# Patient Record
Sex: FEMALE | URBAN - METROPOLITAN AREA
[De-identification: names, ages, dates, MRNs, and addresses within clinical notes are randomized per-mention and may not be internally consistent; named-entity substitution may affect disease eponyms.]

---

## 2024-01-30 ENCOUNTER — APPOINTMENT (RX ONLY)
Dept: URBAN - METROPOLITAN AREA CLINIC 133 | Facility: CLINIC | Age: 69
Setting detail: DERMATOLOGY
End: 2024-01-30

## 2024-01-30 DIAGNOSIS — B02.9 ZOSTER WITHOUT COMPLICATIONS: ICD-10-CM

## 2024-01-30 PROCEDURE — ? COUNSELING

## 2024-01-30 PROCEDURE — ? PRESCRIPTION

## 2024-01-30 PROCEDURE — 99213 OFFICE O/P EST LOW 20 MIN: CPT

## 2024-01-30 RX ORDER — VALACYCLOVIR HYDROCHLORIDE 1 G/1
TABLET, FILM COATED ORAL TID
Qty: 15 | Refills: 0 | Status: ERX | COMMUNITY
Start: 2024-01-30

## 2024-01-30 RX ADMIN — VALACYCLOVIR HYDROCHLORIDE: 1 TABLET, FILM COATED ORAL at 00:00

## 2024-01-30 ASSESSMENT — LOCATION DETAILED DESCRIPTION DERM
LOCATION DETAILED: S2/S3 RIGHT ANTERIOR DERMATOME
LOCATION DETAILED: L1 RIGHT ANTERIOR DERMATOME

## 2024-01-30 ASSESSMENT — LOCATION SIMPLE DESCRIPTION DERM
LOCATION SIMPLE: S2/S3 RIGHT ANTERIOR DERMATOME
LOCATION SIMPLE: L1 RIGHT ANTERIOR DERMATOME

## 2024-01-30 ASSESSMENT — LOCATION ZONE DERM
LOCATION ZONE: GENITALIA
LOCATION ZONE: TRUNK

## 2024-01-30 NOTE — PROCEDURE: COUNSELING
Patient Specific Counseling (Will Not Stick From Patient To Patient): discussed that lesions are contagious until crusted over\\n\\nadvised to avoid pregnant patients\\n\\nrec'd that her  in [place] be vaccinated if possible\\n\\nrec'd that she discuss vaccination with her PCP\\n\\nshe is improved with valtrex 1 g BID but running out of Rx from other provider. new Rx today at [facility]ID dosing
Detail Level: Detailed